# Patient Record
Sex: FEMALE | Race: WHITE | NOT HISPANIC OR LATINO | Employment: FULL TIME | URBAN - METROPOLITAN AREA
[De-identification: names, ages, dates, MRNs, and addresses within clinical notes are randomized per-mention and may not be internally consistent; named-entity substitution may affect disease eponyms.]

---

## 2019-10-15 ENCOUNTER — RECORDS - HEALTHEAST (OUTPATIENT)
Dept: LAB | Facility: CLINIC | Age: 24
End: 2019-10-15

## 2019-10-15 LAB — HIV 1+2 AB+HIV1 P24 AG SERPL QL IA: NEGATIVE

## 2019-10-16 LAB
C TRACH DNA SPEC QL PROBE+SIG AMP: NEGATIVE
T PALLIDUM AB SER QL: NEGATIVE

## 2021-10-14 ENCOUNTER — TRANSFERRED RECORDS (OUTPATIENT)
Dept: HEALTH INFORMATION MANAGEMENT | Facility: CLINIC | Age: 26
End: 2021-10-14
Payer: COMMERCIAL

## 2021-10-26 ENCOUNTER — LAB REQUISITION (OUTPATIENT)
Dept: LAB | Facility: CLINIC | Age: 26
End: 2021-10-26
Payer: COMMERCIAL

## 2021-10-26 DIAGNOSIS — Z11.8 ENCOUNTER FOR SCREENING FOR OTHER INFECTIOUS AND PARASITIC DISEASES: ICD-10-CM

## 2021-10-26 PROCEDURE — 87491 CHLMYD TRACH DNA AMP PROBE: CPT | Mod: ORL | Performed by: FAMILY MEDICINE

## 2021-10-28 LAB — C TRACH DNA SPEC QL NAA+PROBE: NEGATIVE

## 2022-02-06 ENCOUNTER — HEALTH MAINTENANCE LETTER (OUTPATIENT)
Age: 27
End: 2022-02-06

## 2022-05-11 ENCOUNTER — TRANSCRIBE ORDERS (OUTPATIENT)
Dept: MATERNAL FETAL MEDICINE | Facility: CLINIC | Age: 27
End: 2022-05-11
Payer: COMMERCIAL

## 2022-05-11 ENCOUNTER — TRANSCRIBE ORDERS (OUTPATIENT)
Dept: MATERNAL FETAL MEDICINE | Facility: HOSPITAL | Age: 27
End: 2022-05-11
Payer: COMMERCIAL

## 2022-05-11 DIAGNOSIS — Z31.69 ENCOUNTER FOR PRECONCEPTION CONSULTATION: Primary | ICD-10-CM

## 2022-05-25 ENCOUNTER — PRE VISIT (OUTPATIENT)
Dept: MATERNAL FETAL MEDICINE | Facility: HOSPITAL | Age: 27
End: 2022-05-25
Payer: COMMERCIAL

## 2022-05-26 ENCOUNTER — OFFICE VISIT (OUTPATIENT)
Dept: MATERNAL FETAL MEDICINE | Facility: HOSPITAL | Age: 27
End: 2022-05-26
Attending: NURSE PRACTITIONER
Payer: COMMERCIAL

## 2022-05-26 ENCOUNTER — LAB (OUTPATIENT)
Dept: LAB | Facility: HOSPITAL | Age: 27
End: 2022-05-26
Attending: NURSE PRACTITIONER
Payer: COMMERCIAL

## 2022-05-26 DIAGNOSIS — Z31.69 ENCOUNTER FOR PRECONCEPTION CONSULTATION: ICD-10-CM

## 2022-05-26 DIAGNOSIS — Z31.430 ENCOUNTER OF FEMALE FOR TESTING FOR GENETIC DISEASE CARRIER STATUS FOR PROCREATIVE MANAGEMENT: ICD-10-CM

## 2022-05-26 DIAGNOSIS — Z31.430 ENCOUNTER OF FEMALE FOR TESTING FOR GENETIC DISEASE CARRIER STATUS FOR PROCREATIVE MANAGEMENT: Primary | ICD-10-CM

## 2022-05-26 PROCEDURE — 36415 COLL VENOUS BLD VENIPUNCTURE: CPT

## 2022-05-26 PROCEDURE — 96040 HC GENETIC COUNSELING, EACH 30 MINUTES: CPT | Performed by: GENETIC COUNSELOR, MS

## 2022-05-26 NOTE — PROGRESS NOTES
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       Impression/Plan:   Concepción had a genetic consultation today. We reviewed the sperm donors carrier screening information. I offered to send carrier testing for a larger expanded carrier screening to [facility] laboratory (as indicated on the donor's profile) where the donor had testing for the most comprehensive carrier screening. 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     The sperm donor is also a carrier for spinal muscular atrophy. Concepción had the [employer] 14 gene  Carrier screening panel through her primary OB/Gyn clinic. 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 We discussed that this result reduced her risk to be a carrier but does not eliminate the risk. Concepción verbalized an understanding of the limitations of negative carrier screening results.    MCCC2 and GLDC carrier testing was sent to [facility] today. Results are expected in 14-21 days.    Pregnancy History:   /Parity: G0   Age: 27 year old       Family History:   A three-generation pedigree was obtained, and is scanned under the  Media  tab.   The following significant findings were reported by Concepción:  Concepción's mom was diagnosed with breast cancer at the age of 55. Three of Concepción's maternal great aunts were also diagnosed with breast cancer. Concepción is not aware of any genetic testing that was performed for family members.  Most cancers result from a combination of genetic factors and environment. A smaller proportion of cancers are caused  by single gene changes that can be inherited and increase one s risk of developing cancer. We discussed that reviewing her family history with a primary care physician is important so that she can have appropriate surveillance.    Otherwise, the reported family history is negative for multiple miscarriages, stillbirths, birth defects, mental retardation, known genetic conditions, and consanguinity.       Carrier Screening:   Concepción is pursuing intrauterine insemination with a sperm donor. 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 Records for the sperm donor were not available prior today's appointment.    Spinal muscular atrophy (SMA) results in the progressive degeneration of motor neurons. The loss of motor neurons leads to muscle atrophy and impairs an individuals ability to move. There are five main subtypes of SMA. In severe cases, infants with SMA are unable to sit-up independently, have difficulty with feeding and may have respiratory compromise. Infants with the severe form of SMA have a shortened lifespan. Individuals with milder forms of SMA may not have symptoms until later in childhood or adulthood and may not be ambulatory.SMA is an autosomal recessive genetic condition primarily caused by deletions or point mutations in the SMN1 gene. Individuals with SMA most commonly have a deletion in both copies of their SMN1 genes .Individuals that are carriers have one functional copy of the SMN1 gene and one non-functional/deleted copy of the SMN1 gene. Carriers do not develop symptoms of SMA.     3-methylcrotonyl carboxylase deficiency is a metabolic disorder that occurs when the body cannot break-down leucine. It is caused by pathogenic variants in either the MCCC1 or MCCC2 gene. Although clinical presentation may vary, at the more severe end of the spectrum  individuals may have vomiting, diarrhea, lethargy, developmental delay, intellectual disability, seizures, hypotonia,  Hypoglycemia, hyperammonemia,coma and death.  3-methylcrotonyl carboxylase deficiency is an autosomal recessive condition.  Individuals with 3-detention have a pathogenic variant in both copies of their MCCC1 or their MCCC2 genes. .Individuals that are carriers have one functional copy of the either MCCC1 or MCCC2 and one non-functional copy. Carriers do not develop symptoms.    Glycine encephalopathy is also a metabolic disorder that result when the body cannot break-down glycine. It is caused by pathogenic variants in either the AMT or GLDC gene. Clinical presentation can include lethargy, seizures, hypotonia, breathing difficulties, and developmental delays. Glycine encephalopathy is an autosomal recessive condition. Individuals with glycine encephalopathy have a pathogenic variant in both copies of their AMT or GLDC genes. .Individuals that are carriers have one functional copy of the either AMT or GLDC gene and one non-functional copy. Carriers do not develop symptoms.       Testing Options & Risk assessment:   Concepción had a 14 gene carrier screening panel through her primary OB clinic (the [employer] 14 gene panel). This test does include SMN1 deletion analysis. Concepción's [employer] carrier screening result was negative for all 14 genes. I discussed that a negative SMA carrier screening result reduces (but does not eliminate) the risk that she is a carrier for SMA.     Concepción was able to provide some additional information regarding the sperm donor's carrier screening results. 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  His testing was performed by the [facility] laboratory and reportedly included 300 genetic conditions. For the most comprehensive testing option, I offered Concepción the  option of testing through [facility] to be sure we are capturing testing for all the same conditions that the sperm donor was tested for as part of his carrier testing. Concepción declined larger, expanded carrier screening panel.    Concepción preferred to only have testing for MCCC2 (3-methylcrotonyl carboxylase deficiency) and GLDC (glycine encephalopathy). Concepción understood that by testing for these two conditions, she would not have a more comprehensive carrier screening panel. The following was discussed as part of informed consent in addition to the above information:      We discussed [facility] carrier screening which is offered with several different panels. Concepción choose to only test for MCCC2 and GLDC.        Results are typically available in 10-21 days. We will call Concepción with the results and the report will eventually be available via [facility]'s patient portal.       There are implications for family members. If an individual is a carrier of a condition there is a chance for relatives to also be a carrier. This may be helpful information to disclose to family (siblings, cousins) so they may choose if they want to pursue carriers screening. In addition, if only one parent is found to be a carrier, there is a 50% chance for each child to be a carrier. This may be helpful information for the patient's children when they start a family.       [facility] will contact the patient via text, e-mail, or both with cost estimate information. The communication will list the cost billed through insurance and provide an option for self-pay. The default is insurance billing so patients must choose the self pay option and follow through with credit card information if desired. The patient has the responsibility to determine if insurance or self pay is a better financial decision.     Concepción indicated that if she is a carrier for 3-methylcrotonyl carboxylase deficiency or glycine encephalopathy she is planning to choose a new sperm donor.        It was a pleasure to be involved with Concepción s care. Face-to-face time of the meeting was 30 minutes.    Maisha Gould MS, [facility]  Maternal Fetal Medicine  [facility]  Phone:[phone]

## 2022-10-02 ENCOUNTER — HEALTH MAINTENANCE LETTER (OUTPATIENT)
Age: 27
End: 2022-10-02

## 2023-02-11 ENCOUNTER — HEALTH MAINTENANCE LETTER (OUTPATIENT)
Age: 28
End: 2023-02-11

## 2023-09-12 ENCOUNTER — LAB REQUISITION (OUTPATIENT)
Dept: LAB | Facility: CLINIC | Age: 28
End: 2023-09-12
Payer: COMMERCIAL

## 2023-09-12 DIAGNOSIS — K52.9 NONINFECTIVE GASTROENTERITIS AND COLITIS, UNSPECIFIED: ICD-10-CM

## 2023-09-12 PROCEDURE — 80053 COMPREHEN METABOLIC PANEL: CPT | Mod: ORL | Performed by: FAMILY MEDICINE

## 2023-09-13 ENCOUNTER — LAB REQUISITION (OUTPATIENT)
Dept: LAB | Facility: CLINIC | Age: 28
End: 2023-09-13
Payer: COMMERCIAL

## 2023-09-13 DIAGNOSIS — K52.9 NONINFECTIVE GASTROENTERITIS AND COLITIS, UNSPECIFIED: ICD-10-CM

## 2023-09-13 LAB
ALBUMIN SERPL BCG-MCNC: 4.5 G/DL (ref 3.5–5.2)
ALP SERPL-CCNC: 66 U/L (ref 35–104)
ALT SERPL W P-5'-P-CCNC: 18 U/L (ref 0–50)
ANION GAP SERPL CALCULATED.3IONS-SCNC: 12 MMOL/L (ref 7–15)
AST SERPL W P-5'-P-CCNC: 19 U/L (ref 0–45)
BILIRUB SERPL-MCNC: 0.2 MG/DL
BUN SERPL-MCNC: 8.5 MG/DL (ref 6–20)
CALCIUM SERPL-MCNC: 9.2 MG/DL (ref 8.6–10)
CHLORIDE SERPL-SCNC: 104 MMOL/L (ref 98–107)
CREAT SERPL-MCNC: 0.9 MG/DL (ref 0.51–0.95)
DEPRECATED HCO3 PLAS-SCNC: 24 MMOL/L (ref 22–29)
EGFRCR SERPLBLD CKD-EPI 2021: 89 ML/MIN/1.73M2
GLUCOSE SERPL-MCNC: 92 MG/DL (ref 70–99)
POTASSIUM SERPL-SCNC: 4 MMOL/L (ref 3.4–5.3)
PROT SERPL-MCNC: 7.1 G/DL (ref 6.4–8.3)
SODIUM SERPL-SCNC: 140 MMOL/L (ref 136–145)

## 2023-09-13 PROCEDURE — 87493 C DIFF AMPLIFIED PROBE: CPT | Mod: ORL | Performed by: FAMILY MEDICINE

## 2023-09-13 PROCEDURE — 87507 IADNA-DNA/RNA PROBE TQ 12-25: CPT | Mod: ORL | Performed by: FAMILY MEDICINE

## 2023-09-14 LAB — C DIFF TOX B STL QL: NEGATIVE

## 2023-09-15 LAB

## 2023-11-03 ENCOUNTER — LAB REQUISITION (OUTPATIENT)
Dept: LAB | Facility: CLINIC | Age: 28
End: 2023-11-03
Payer: COMMERCIAL

## 2023-11-03 DIAGNOSIS — E03.9 HYPOTHYROIDISM, UNSPECIFIED: ICD-10-CM

## 2023-11-03 DIAGNOSIS — Z11.3 ENCOUNTER FOR SCREENING FOR INFECTIONS WITH A PREDOMINANTLY SEXUAL MODE OF TRANSMISSION: ICD-10-CM

## 2023-11-03 PROCEDURE — 87491 CHLMYD TRACH DNA AMP PROBE: CPT | Mod: ORL | Performed by: FAMILY MEDICINE

## 2023-11-03 PROCEDURE — 86780 TREPONEMA PALLIDUM: CPT | Mod: ORL | Performed by: FAMILY MEDICINE

## 2023-11-03 PROCEDURE — 87591 N.GONORRHOEAE DNA AMP PROB: CPT | Mod: ORL | Performed by: FAMILY MEDICINE

## 2023-11-03 PROCEDURE — 84443 ASSAY THYROID STIM HORMONE: CPT | Mod: ORL | Performed by: FAMILY MEDICINE

## 2023-11-04 LAB
C TRACH DNA SPEC QL PROBE+SIG AMP: NEGATIVE
N GONORRHOEA DNA SPEC QL NAA+PROBE: NEGATIVE
T PALLIDUM AB SER QL: NONREACTIVE
TSH SERPL DL<=0.005 MIU/L-ACNC: 2.85 UIU/ML (ref 0.3–4.2)

## 2023-11-07 ENCOUNTER — LAB REQUISITION (OUTPATIENT)
Dept: LAB | Facility: CLINIC | Age: 28
End: 2023-11-07
Payer: COMMERCIAL

## 2023-11-07 DIAGNOSIS — Z11.3 ENCOUNTER FOR SCREENING FOR INFECTIONS WITH A PREDOMINANTLY SEXUAL MODE OF TRANSMISSION: ICD-10-CM

## 2023-11-07 PROCEDURE — 87389 HIV-1 AG W/HIV-1&-2 AB AG IA: CPT | Mod: ORL | Performed by: FAMILY MEDICINE

## 2023-11-08 LAB — HIV 1+2 AB+HIV1 P24 AG SERPL QL IA: NONREACTIVE

## 2024-02-20 ENCOUNTER — LAB REQUISITION (OUTPATIENT)
Dept: LAB | Facility: CLINIC | Age: 29
End: 2024-02-20

## 2024-02-20 DIAGNOSIS — M62.838 OTHER MUSCLE SPASM: ICD-10-CM

## 2024-02-20 PROCEDURE — 83735 ASSAY OF MAGNESIUM: CPT | Performed by: FAMILY MEDICINE

## 2024-02-21 LAB — MAGNESIUM SERPL-MCNC: 2.2 MG/DL (ref 1.7–2.3)

## 2024-03-09 ENCOUNTER — HEALTH MAINTENANCE LETTER (OUTPATIENT)
Age: 29
End: 2024-03-09

## 2024-07-09 ENCOUNTER — APPOINTMENT (OUTPATIENT)
Dept: ULTRASOUND IMAGING | Facility: CLINIC | Age: 29
End: 2024-07-09
Attending: EMERGENCY MEDICINE
Payer: COMMERCIAL

## 2024-07-09 ENCOUNTER — HOSPITAL ENCOUNTER (EMERGENCY)
Facility: CLINIC | Age: 29
Discharge: HOME | End: 2024-07-09
Attending: EMERGENCY MEDICINE | Admitting: EMERGENCY MEDICINE
Payer: COMMERCIAL

## 2024-07-09 VITALS
TEMPERATURE: 98 F | BODY MASS INDEX: 33.75 KG/M2 | SYSTOLIC BLOOD PRESSURE: 135 MMHG | HEIGHT: 66 IN | DIASTOLIC BLOOD PRESSURE: 78 MMHG | RESPIRATION RATE: 18 BRPM | OXYGEN SATURATION: 96 % | WEIGHT: 210 LBS | HEART RATE: 76 BPM

## 2024-07-09 DIAGNOSIS — N93.9 VAGINAL BLEEDING: ICD-10-CM

## 2024-07-09 DIAGNOSIS — R89.9 ABNORMAL LABORATORY TEST: ICD-10-CM

## 2024-07-09 PROBLEM — F42.2 MIXED OBSESSIONAL THOUGHTS AND ACTS: Status: ACTIVE | Noted: 2019-02-14

## 2024-07-09 PROBLEM — S52.502A CLOSED FRACTURE OF LEFT DISTAL RADIUS: Status: ACTIVE | Noted: 2022-10-27

## 2024-07-09 LAB
ABO/RH(D): NORMAL
ANTIBODY SCREEN: NEGATIVE
ERYTHROCYTE [DISTWIDTH] IN BLOOD BY AUTOMATED COUNT: 12.9 % (ref 10–15)
HCG INTACT+B SERPL-ACNC: 1672 MIU/ML
HCT VFR BLD AUTO: 37.2 % (ref 35–47)
HGB BLD-MCNC: 12.2 G/DL (ref 11.7–15.7)
MCH RBC QN AUTO: 28.4 PG (ref 26.5–33)
MCHC RBC AUTO-ENTMCNC: 32.8 G/DL (ref 31.5–36.5)
MCV RBC AUTO: 87 FL (ref 78–100)
PLATELET # BLD AUTO: 431 10E3/UL (ref 150–450)
RBC # BLD AUTO: 4.3 10E6/UL (ref 3.8–5.2)
SPECIMEN EXPIRATION DATE: NORMAL
WBC # BLD AUTO: 11.3 10E3/UL (ref 4–11)

## 2024-07-09 PROCEDURE — 76801 OB US < 14 WKS SINGLE FETUS: CPT

## 2024-07-09 PROCEDURE — 99285 EMERGENCY DEPT VISIT HI MDM: CPT | Mod: 25

## 2024-07-09 PROCEDURE — 36415 COLL VENOUS BLD VENIPUNCTURE: CPT | Performed by: EMERGENCY MEDICINE

## 2024-07-09 PROCEDURE — 86900 BLOOD TYPING SEROLOGIC ABO: CPT | Performed by: EMERGENCY MEDICINE

## 2024-07-09 PROCEDURE — 85027 COMPLETE CBC AUTOMATED: CPT | Performed by: EMERGENCY MEDICINE

## 2024-07-09 PROCEDURE — 84702 CHORIONIC GONADOTROPIN TEST: CPT | Performed by: EMERGENCY MEDICINE

## 2024-07-09 ASSESSMENT — COLUMBIA-SUICIDE SEVERITY RATING SCALE - C-SSRS
1. IN THE PAST MONTH, HAVE YOU WISHED YOU WERE DEAD OR WISHED YOU COULD GO TO SLEEP AND NOT WAKE UP?: NO
2. HAVE YOU ACTUALLY HAD ANY THOUGHTS OF KILLING YOURSELF IN THE PAST MONTH?: NO
6. HAVE YOU EVER DONE ANYTHING, STARTED TO DO ANYTHING, OR PREPARED TO DO ANYTHING TO END YOUR LIFE?: NO

## 2024-07-09 ASSESSMENT — ACTIVITIES OF DAILY LIVING (ADL): ADLS_ACUITY_SCORE: 35

## 2024-07-09 NOTE — ED PROVIDER NOTES
EMERGENCY DEPARTMENT ENCOUNTER      NAME: Concepción Kapadia  AGE: 29 year old female  YOB: 1995  MRN: [id number]  EVALUATION DATE & TIME: No admission date for patient encounter.    ED PROVIDER: Kathleen Zavala MD    Chief Complaint   Patient presents with    Vaginal Bleeding - Pregnant       FINAL IMPRESSION  1. Abnormal laboratory test    2. Vaginal bleeding        MEDICAL DECISION MAKING   Concepción Kapadia is a 29 year old female who presents for evaluation of vaginal bleeding and rising hCG levels.  Patient was seen at the women's clinic on 7/3 (7 days ago)  patient had and underwent medical .  She reports that  prior to this, she had been experiencing some sharp right lower quadrant pain but since taking the medication, this has completely resolved.  She has had some vaginal bleeding which she reports increased yesterday but has since been decreasing.  Yesterday, she was seen in clinic for repeat labs and had an hCG level drawn.  Today, she received a phone call notifying her that the level was increasing  and she was advised to come to the emergency department to rule out ectopic pregnancy.  Patient has no associated fever, dysuria, hematuria, lightheadedness, dyspnea, or abdominal pain.  No other new complaints.    I considered a broad differential including but not limited to incomplete/missed , ectopic pregnancy, molar pregnancy, spurious lab. Discussed options for workup and management with patient and agreed on plan for repeat labs and US.       ED Course as of 07/10/24 0030    OB  US 1st trimester w transvag  Ultrasound without clear evidence of ectopic, incomplete , retained products, or other abnormality to explain symptoms.    CBC (+ platelets, no diff)(!)  CBC reassuring. No evidence of leukocytosis to suggest systemic infectious/inflammatory process. No acute anemia. PLTs wnl.     hCG Quantitative(!): 1,672  hCG level elevated but without  recent for comparison in our system.      Patient was Rh+ so will not require RhoGAM.  Labs and ultrasound as she and her mother.  I also discussed the case with Dr. Correa with [facility] who agreed that overall, workup today is reassuring.  Unfortunately, given the previous hCG was obtained at outside labs, we will but unable to compare today.  We have agreed that would expect this to be higher if related to ectopic or molar pregnancy.  Patient is hemodynamically stable here and tolerating p.o. without any difficulty.  OB team recommended discharge and follow-up for repeat labs/ultrasound in 48 hours.  Someone from their team will reach out to the patient to arrange this appointment.  I rechecked the patient and updated her with these recommendations and she and her mother were comfortable with plan.    At the end of the encounter, we reviewed the results, potential diagnoses, as well as return precautions and recommendations for follow up. I instructed Ms. Kapadia to return to the emergency department immediately if she develops any new or worsening symptoms and provided additional verbal discharge instructions. Ms. Kapadia expressed understanding and agreement with this plan of care, her questions were answered, and she was discharged in stable condition.      Considerations in Medical Decision Making  History:  Supplemental history from: Family Member/Significant Other  External Record(s) reviewed: Documented in chart    Work Up:  Chart documentation includes differential considered and any EKGs or imaging independently interpreted by provider, where specified.  In additional to work up documented, I considered the following work up: Documented in chart, if applicable.    External consultation:  Discussion of management with another provider: OB-Gyn    Complicating factors:  Care impacted by chronic illness: N/A  Care affected by social determinants of health: Access to Medical Care    Disposition  considerations: Discharge. No recommendations on prescription strength medication(s). I considered admission, but ultimately discharged patient after speaking with OB.      ED COURSE  4:23 PM I met with the patient for initial interview and encounter. We discussed a plan for treatment and diagnostic interventions.   7:55 PM I spoke with Dr. Correa, OB GYN from [facility]  8:01 PM I rechecked and updated the patient on results. We discussed the plan for discharge and the patient is agreeable. Reviewed supportive cares, symptomatic treatment, outpatient follow up, and reasons to return to the Emergency Department. All questions and concerns were addressed. Patient to be discharged by ED RN.       MEDICATIONS GIVEN IN THE ED  Medications - No data to display    NEW PRESCRIPTIONS STARTED AT TODAY'S VISIT  There are no discharge medications for this patient.         =================================================================    HPI:    Patient information was obtained from: the patient    Use of : N/A      Concepción Kapadia is a 29 year old female who presents to the ED by private car to rule out ectopic pregnancy.    The patient reports receiving a phone call from [facility] today about her HGC levels rising, so they advised her to present to the ED to rule out an ectopic pregnancy.    She reports going to [facility] on 7/3 (7 days ago) for an . She states taking the  medication, resolving the sharp RLQ abdominal pain. She endorses vaginal bleeding but it has been decreasing in amount.     She states she was lightheaded when she received the phone call from her clinic but thinks it is stress-related. She took Advil prior to arrival. No history of abdominal surgery or ovarian cyst.     The patient denies back pain, dysuria, hematuria, and any other complaints at this time.       RELEVANT HISTORY, MEDICATIONS, & ALLERGIES   Past medical history, surgical  "history, family history, medications, and allergies reviewed and pertinent noted in HPI.    REVIEW OF SYSTEMS:  A complete review of systems was performed with pertinent positives and negatives noted in the HPI. All other systems negative.     PHYSICAL EXAM:    Vitals: /78   Pulse 76   Temp 98  F (36.7  C) (Temporal)   Resp 18   Ht 1.676 m (5' 6\")   Wt 95.3 kg (210 lb)   LMP 07/04/2024 (Exact Date)   SpO2 96%   BMI 33.89 kg/m     General: Alert and interactive, comfortable appearing.  HENT: Atraumatic. Full AROM of neck. MMM.  Cardiovascular: Regular rate and rhythm.   Chest/Pulmonary: Normal work of breathing. Speaking in complete sentences. Lungs CTAB. No chest wall tenderness or deformities.  Abdomen: Soft, nondistended. Nontender without guarding or rebound.  Extremities: Normal AROM of all major joints.  Skin: Warm and dry. Normal skin color.   Neuro: Speech clear. CNs grossly intact. Moves all extremities spontaneously.   Psych: Normal affect/mood, cooperative, memory appropriate.      LAB  Labs Ordered and Resulted from Time of ED Arrival to Time of ED Departure   HCG QUANTITATIVE PREGNANCY - Abnormal       Result Value    hCG Quantitative 1,672 (*)    CBC WITH PLATELETS - Abnormal    WBC Count 11.3 (*)     RBC Count 4.30      Hemoglobin 12.2      Hematocrit 37.2      MCV 87      MCH 28.4      MCHC 32.8      RDW 12.9      Platelet Count 431     TYPE AND SCREEN, ADULT    ABO/RH(D) A POS      Antibody Screen Negative      SPECIMEN EXPIRATION DATE [id number]     ABO/RH TYPE AND SCREEN       RADIOLOGY  OB  US 1st trimester w transvag   Final Result   IMPRESSION:       1.  Pregnancy of unknown location. Possibilities include failed pregnancy (favored in this clinical context), an unidentified ectopic pregnancy, or an early intrauterine pregnancy (too early to detect). Correlate with beta hCG values and consider    follow-up ultrasound in 7-14 days.       2.  If this is a failed pregnancy, there " is no sonographic evidence of retained products of conception.            I, Murali Erazo, am serving as a scribe to document services personally performed by Dr. Kathleen Zavala based on my observation and the provider's statements to me. I, Kathleen Zavala MD attest that Murali Erazo is acting in a scribe capacity, has observed my performance of the services and has documented them in accordance with my direction.    Kathleen Zavala M.D.  Emergency Medicine  [facility] EMERGENCY ROOM  [address] [address]  [phone]  Dept: [phone]     Kathleen Zavala MD  07/10/24 0030

## 2024-07-10 NOTE — DISCHARGE INSTRUCTIONS
You were seen in the Emergency Department today for a recheck of labs.    Like we talked about, your hCG level here was elevated but without having the previous done in the same lab, is difficult to compare.  I think that it will continue to come down but may be just taking longer than expected.  Your ultrasound today did not show any abnormalities but I am concerned about or that make me worried about an ectopic or other complication from the .  Your labs also are very stable.      Please return to the ER if you experience increasing bleeding, pain, fevers, and/or for any other new or concerning symptoms, otherwise please follow up with the OB clinic in 48 hours days for recheck.  Someone from the clinic should be calling in the morning to set up an appointment.    Thank you for choosing [facility]. It was a pleasure taking care of you today!  - Dr. Kathleen Zavala

## 2024-07-11 ENCOUNTER — LAB (OUTPATIENT)
Dept: LAB | Facility: CLINIC | Age: 29
End: 2024-07-11
Payer: COMMERCIAL

## 2024-07-11 DIAGNOSIS — R89.9 ABNORMAL LABORATORY TEST RESULT: Primary | ICD-10-CM

## 2024-07-11 LAB — HCG INTACT+B SERPL-ACNC: 1729 MIU/ML

## 2024-07-11 PROCEDURE — 36415 COLL VENOUS BLD VENIPUNCTURE: CPT

## 2024-07-11 PROCEDURE — 84702 CHORIONIC GONADOTROPIN TEST: CPT

## 2024-07-12 ENCOUNTER — HOSPITAL ENCOUNTER (EMERGENCY)
Facility: CLINIC | Age: 29
End: 2024-07-12
Payer: COMMERCIAL

## 2024-07-12 NOTE — ED NOTES
Expected Patient Referral to ED  4:10 PM    Referring Clinic/Provider:  [facility]    Reason for referral/Clinical facts:  [facility] per [facility]) - didn't have confirmed IUP prior to this termination, then seen here in the ED on  and had US that showed no clear evidence of ectopic, incomplete , retained products or other abnormality to explain symptoms and discharged with plan for repeat beta, beta HCG yesterday has now gone up so OB sending in for repeat Beta HCG and if still going up concerning for ectopic and will need methotrexate (call them after repeat Beta HCG)    Recommendations provided:  Send to ED for further evaluation    Caller was informed that this institution does possess the capabilities and/or resources to provide for patient and should be transferred to our facility.    Discussed that if direct admit is sought and any hurdles are encountered, this ED would be happy to see the patient and evaluate.    Informed caller that recommendations provided are recommendations based only on the facts provided and that they responsible to accept or reject the advice, or to seek a formal in person consultation as needed and that this ED will see/treat patient should they arrive.      Ciarra Cam MD  [facility] EMERGENCY ROOM  [facility] [facility] [address] [phone]       Ciarra Cam MD  [phone]

## 2024-07-13 ENCOUNTER — HOSPITAL ENCOUNTER (EMERGENCY)
Facility: CLINIC | Age: 29
Discharge: HOME | End: 2024-07-13
Attending: STUDENT IN AN ORGANIZED HEALTH CARE EDUCATION/TRAINING PROGRAM | Admitting: STUDENT IN AN ORGANIZED HEALTH CARE EDUCATION/TRAINING PROGRAM
Payer: COMMERCIAL

## 2024-07-13 ENCOUNTER — APPOINTMENT (OUTPATIENT)
Dept: ULTRASOUND IMAGING | Facility: CLINIC | Age: 29
End: 2024-07-13
Payer: COMMERCIAL

## 2024-07-13 VITALS
WEIGHT: 216 LBS | OXYGEN SATURATION: 99 % | DIASTOLIC BLOOD PRESSURE: 57 MMHG | BODY MASS INDEX: 34.72 KG/M2 | HEIGHT: 66 IN | TEMPERATURE: 98.6 F | RESPIRATION RATE: 20 BRPM | HEART RATE: 80 BPM | SYSTOLIC BLOOD PRESSURE: 110 MMHG

## 2024-07-13 DIAGNOSIS — O07.4: ICD-10-CM

## 2024-07-13 DIAGNOSIS — O02.81 INAPPROPRIATE CHANGE IN QUANTITATIVE HUMAN CHORIONIC GONADOTROPIN (HCG) IN EARLY PREGNANCY: ICD-10-CM

## 2024-07-13 PROBLEM — E03.8 SUBCLINICAL HYPOTHYROIDISM: Status: ACTIVE | Noted: 2024-07-13

## 2024-07-13 PROBLEM — N97.9 FEMALE INFERTILITY: Status: ACTIVE | Noted: 2024-07-13

## 2024-07-13 PROBLEM — N91.1 SECONDARY AMENORRHEA: Status: ACTIVE | Noted: 2024-07-13

## 2024-07-13 PROBLEM — N88.8 CERVICAL DISCHARGE: Status: ACTIVE | Noted: 2024-07-13

## 2024-07-13 PROBLEM — A64 SEXUALLY TRANSMITTED INFECTION: Status: ACTIVE | Noted: 2024-07-13

## 2024-07-13 LAB
ALBUMIN SERPL BCG-MCNC: 4.2 G/DL (ref 3.5–5.2)
ALP SERPL-CCNC: 72 U/L (ref 40–150)
ALT SERPL W P-5'-P-CCNC: 35 U/L (ref 0–50)
ANION GAP SERPL CALCULATED.3IONS-SCNC: 12 MMOL/L (ref 7–15)
AST SERPL W P-5'-P-CCNC: 33 U/L (ref 0–45)
BASOPHILS # BLD AUTO: 0 10E3/UL (ref 0–0.2)
BASOPHILS NFR BLD AUTO: 1 %
BILIRUB DIRECT SERPL-MCNC: <0.2 MG/DL (ref 0–0.3)
BILIRUB SERPL-MCNC: 0.4 MG/DL
BUN SERPL-MCNC: 10.3 MG/DL (ref 6–20)
CALCIUM SERPL-MCNC: 9.1 MG/DL (ref 8.6–10)
CHLORIDE SERPL-SCNC: 102 MMOL/L (ref 98–107)
CREAT SERPL-MCNC: 0.78 MG/DL (ref 0.51–0.95)
DEPRECATED HCO3 PLAS-SCNC: 23 MMOL/L (ref 22–29)
EGFRCR SERPLBLD CKD-EPI 2021: >90 ML/MIN/1.73M2
EOSINOPHIL # BLD AUTO: 0.3 10E3/UL (ref 0–0.7)
EOSINOPHIL NFR BLD AUTO: 3 %
ERYTHROCYTE [DISTWIDTH] IN BLOOD BY AUTOMATED COUNT: 12.8 % (ref 10–15)
GLUCOSE SERPL-MCNC: 90 MG/DL (ref 70–99)
HCG INTACT+B SERPL-ACNC: 2092 MIU/ML
HCT VFR BLD AUTO: 36 % (ref 35–47)
HGB BLD-MCNC: 12 G/DL (ref 11.7–15.7)
HOLD SPECIMEN: NORMAL
HOLD SPECIMEN: NORMAL
IMM GRANULOCYTES # BLD: 0 10E3/UL
IMM GRANULOCYTES NFR BLD: 0 %
LYMPHOCYTES # BLD AUTO: 2.5 10E3/UL (ref 0.8–5.3)
LYMPHOCYTES NFR BLD AUTO: 31 %
MCH RBC QN AUTO: 28.6 PG (ref 26.5–33)
MCHC RBC AUTO-ENTMCNC: 33.3 G/DL (ref 31.5–36.5)
MCV RBC AUTO: 86 FL (ref 78–100)
MONOCYTES # BLD AUTO: 0.6 10E3/UL (ref 0–1.3)
MONOCYTES NFR BLD AUTO: 7 %
NEUTROPHILS # BLD AUTO: 4.7 10E3/UL (ref 1.6–8.3)
NEUTROPHILS NFR BLD AUTO: 58 %
NRBC # BLD AUTO: 0 10E3/UL
NRBC BLD AUTO-RTO: 0 /100
PLATELET # BLD AUTO: 392 10E3/UL (ref 150–450)
POTASSIUM SERPL-SCNC: 4.3 MMOL/L (ref 3.4–5.3)
PROT SERPL-MCNC: 7 G/DL (ref 6.4–8.3)
RBC # BLD AUTO: 4.19 10E6/UL (ref 3.8–5.2)
SODIUM SERPL-SCNC: 137 MMOL/L (ref 135–145)
WBC # BLD AUTO: 8.1 10E3/UL (ref 4–11)

## 2024-07-13 PROCEDURE — 82040 ASSAY OF SERUM ALBUMIN: CPT

## 2024-07-13 PROCEDURE — 250N000011 HC RX IP 250 OP 636: Performed by: OBSTETRICS & GYNECOLOGY

## 2024-07-13 PROCEDURE — 99285 EMERGENCY DEPT VISIT HI MDM: CPT | Mod: 25

## 2024-07-13 PROCEDURE — 36415 COLL VENOUS BLD VENIPUNCTURE: CPT

## 2024-07-13 PROCEDURE — 84702 CHORIONIC GONADOTROPIN TEST: CPT

## 2024-07-13 PROCEDURE — 85004 AUTOMATED DIFF WBC COUNT: CPT

## 2024-07-13 PROCEDURE — 96401 CHEMO ANTI-NEOPL SQ/IM: CPT

## 2024-07-13 PROCEDURE — 76801 OB US < 14 WKS SINGLE FETUS: CPT

## 2024-07-13 RX ORDER — METHOTREXATE 25 MG/ML
50 INJECTION INTRA-ARTERIAL; INTRAMUSCULAR; INTRATHECAL; INTRAVENOUS ONCE
Status: COMPLETED | OUTPATIENT
Start: 2024-07-13 | End: 2024-07-13

## 2024-07-13 RX ADMIN — METHOTREXATE 107 MG: 25 SOLUTION INTRA-ARTERIAL; INTRAMUSCULAR; INTRATHECAL; INTRAVENOUS at 15:44

## 2024-07-13 ASSESSMENT — COLUMBIA-SUICIDE SEVERITY RATING SCALE - C-SSRS
2. HAVE YOU ACTUALLY HAD ANY THOUGHTS OF KILLING YOURSELF IN THE PAST MONTH?: NO
6. HAVE YOU EVER DONE ANYTHING, STARTED TO DO ANYTHING, OR PREPARED TO DO ANYTHING TO END YOUR LIFE?: NO
1. IN THE PAST MONTH, HAVE YOU WISHED YOU WERE DEAD OR WISHED YOU COULD GO TO SLEEP AND NOT WAKE UP?: NO

## 2024-07-13 ASSESSMENT — ACTIVITIES OF DAILY LIVING (ADL)
ADLS_ACUITY_SCORE: 35

## 2024-07-13 NOTE — Clinical Note
Concepción Kapadia was seen and treated in our emergency department on 7/13/2024.         Sincerely,     [facility] Emergency Room

## 2024-07-13 NOTE — DISCHARGE INSTRUCTIONS
Your emergency department evaluation was reassuring today.  Your hCG levels boris again today (2,092) from your level on 07/11 (1,729).  You received your one time dose of methotrexate in the ER today.    Follow up with [facility] for follow up within 4 days.  Discontinue any vitamins and non-steroidal medications, like ibuprofen and naproxen (check with your provider at the next clinic visit when to resume)   Do not consume alcohol (check with your provider at the next clinic visit when to resume)  Avoid intercourse (check with your provider at the next clinic visit when to resume)  Avoid vigorous physical activity (check with your provider at the next clinic visit when to resume)  Contact doctor's clinic if experiencing severe abdominal pain, chest pain, weakness, dizziness or fainting after Methotrexate is given.       Call 911 anytime you think you may need emergency care. For example, call if:    You passed out (lost consciousness).     You have severe vaginal bleeding. This means that you are soaking through your usual pads every hour for 2 or more hours.     You have sudden, severe pain in your belly or pelvis.     You feel you cannot stop from hurting yourself or someone else.   Where to get help 24 hours a day, 7 days a week   If you or someone you know talks about suicide, self-harm, a mental health crisis, a substance use crisis, or any other kind of emotional distress, get help right away. You can:    Call the Suicide and Crisis Lifeline at 988.     Call 8-032-424-TALK (1-486.207.8935).     Text HOME to 427800 to access the Crisis Text Line.   Consider saving these numbers in your phone.  Go to [employer].org for more information or to chat online.  Call your doctor now or seek immediate medical care if:    You have a fever.     You are dizzy or lightheaded, or you feel like you may faint.     You have new or worse pain in your belly or pelvis.     You have vaginal discharge that smells bad.   Watch  closely for changes in your health, and be sure to contact your doctor if:    You do not get better as expected.

## 2024-07-13 NOTE — ED PROVIDER NOTES
"Emergency Department Midlevel Supervisory Note     I had a face to face encounter with this patient seen by the Advanced Practice Provider (ANYI). I personally made/approved the management plan and take responsibility for the patient management. I personally saw patient and performed a substantive portion of the visit including all aspects of the medical decision making.     ED Course:  11:58 AM Manasa Voss PA-C staffed patient with me. I agree with their assessment and plan of management, and I will see the patient.  12:02 PM I met with the patient to introduce myself, gather additional history, perform my initial exam, and discuss the plan.   2:00 PM Reassessed and updated patient with findings.    Brief HPI:     Concepción Kapadia is a 29 year old female who presents for evaluation of abdominal pain and vaginal bleeding.     Patient reports her LMP was 2024 and she took an  pill on 2024. Her OB provider noted up trending HCG and was concerned for ectopic pregnancy and advised her to go to the ED. Patient does have some minimal RLQ abdominal pain. She's never been pregnant before and denies history of miscarriage. There were no other concerns/complaints at this time.    I, Pavithra De La Cruz, am serving as a scribe to document services personally performed by Silviano Medellin MD, based on my observations and the provider's statements to me.   I, Silviano Medellin MD, attest that Pavithra De La Cruz was acting in a scribe capacity, has observed my performance of the services and has documented them in accordance with my direction.    Brief Physical Exam: /66   Pulse 74   Temp 97  F (36.1  C) (Temporal)   Resp 16   Ht 1.676 m (5' 6\")   Wt 98 kg (216 lb)   LMP 2024 (Exact Date)   SpO2 99%   BMI 34.86 kg/m    Constitutional:  Alert, in no acute distress  EYES: Conjunctivae clear  HENT:  Atraumatic  Respiratory:  Respirations even, unlabored, in no acute respiratory distress  Cardiovascular:  Regular " rate and rhythm, good peripheral perfusion  GI: Soft, non-distended, some mild right lower quadrant tenderness to palpation, no guarding or rebound.  Musculoskeletal:  Moves all 4 extremities equally, grossly symmetrical strength  Integument: Warm & dry. No appreciable rash, erythema.  Neurologic:  Alert & oriented, speech clear and fluent, no focal deficits noted  Psych: Normal mood and affect       MDM:  29-year-old female presents for evaluation of incomplete .  Patient had a medical  on , had some bleeding and cramping thereafter, though it resolved within a few days.  Subsequent beta-hCG increased to about 1600, though ultrasound is negative.  Repeat hCG on the  returned at 1700 which is increasing from prior.  Repeat today returned at 2000, which also continues to increase.  Discussed with OB/GYN, they recommended ultrasound to evaluate for possible ruptured ectopic, though likely will be managed with methotrexate.  Patient hemodynamically stable, no tachycardia, hemoglobin appears normal at 12.0, not tachycardic.  Will obtain ultrasound and coordinate with OB/GYN regarding likely methotrexate.    1. Inappropriate change in quantitative human chorionic gonadotropin (hCG) in early pregnancy        Consults:  OB-Gyn    Labs and Imaging:  Results for orders placed or performed during the hospital encounter of 24   HCG quantitative pregnancy (blood)   Result Value Ref Range    hCG Quantitative 2,092 (H) <5 mIU/mL   Extra Red Top Tube   Result Value Ref Range    Hold Specimen JIC    Extra Purple Top Tube   Result Value Ref Range    Hold Specimen JIC    Hepatic function panel   Result Value Ref Range    Protein Total 7.0 6.4 - 8.3 g/dL    Albumin 4.2 3.5 - 5.2 g/dL    Bilirubin Total 0.4 <=1.2 mg/dL    Alkaline Phosphatase 72 40 - 150 U/L    AST 33 0 - 45 U/L    ALT 35 0 - 50 U/L    Bilirubin Direct <0.20 0.00 - 0.30 mg/dL   Basic metabolic panel   Result Value Ref Range    Sodium 137  135 - 145 mmol/L    Potassium 4.3 3.4 - 5.3 mmol/L    Chloride 102 98 - 107 mmol/L    Carbon Dioxide (CO2) 23 22 - 29 mmol/L    Anion Gap 12 7 - 15 mmol/L    Urea Nitrogen 10.3 6.0 - 20.0 mg/dL    Creatinine 0.78 0.51 - 0.95 mg/dL    GFR Estimate >90 >60 mL/min/1.73m2    Calcium 9.1 8.6 - 10.0 mg/dL    Glucose 90 70 - 99 mg/dL   CBC with platelets and differential   Result Value Ref Range    WBC Count 8.1 4.0 - 11.0 10e3/uL    RBC Count 4.19 3.80 - 5.20 10e6/uL    Hemoglobin 12.0 11.7 - 15.7 g/dL    Hematocrit 36.0 35.0 - 47.0 %    MCV 86 78 - 100 fL    MCH 28.6 26.5 - 33.0 pg    MCHC 33.3 31.5 - 36.5 g/dL    RDW 12.8 10.0 - 15.0 %    Platelet Count 392 150 - 450 10e3/uL    % Neutrophils 58 %    % Lymphocytes 31 %    % Monocytes 7 %    % Eosinophils 3 %    % Basophils 1 %    % Immature Granulocytes 0 %    NRBCs per 100 WBC 0 <1 /100    Absolute Neutrophils 4.7 1.6 - 8.3 10e3/uL    Absolute Lymphocytes 2.5 0.8 - 5.3 10e3/uL    Absolute Monocytes 0.6 0.0 - 1.3 10e3/uL    Absolute Eosinophils 0.3 0.0 - 0.7 10e3/uL    Absolute Basophils 0.0 0.0 - 0.2 10e3/uL    Absolute Immature Granulocytes 0.0 <=0.4 10e3/uL    Absolute NRBCs 0.0 10e3/uL       I have reviewed the relevant laboratory studies above.      Procedures:  I was present for the key portions of procedures documented in ANYI/midlevel note, see midlevel note for further details.    Silviano Medellin MD  [facility] EMERGENCY ROOM  [address] [address] [phone]       Silviano Medellin MD  07/13/24 3655

## 2024-07-13 NOTE — ED TRIAGE NOTES
Pt arrives to ED with c/o possible ectopic pregnancy. Pt last menstrual cycle , pt took the  pill on  to terminate pregnancy. Pt states HCG levels are going up which provider states is probably an ectopic pregnancy and needs a different medication. No fetus on ultrasound due to being too small and early in her pregnancy.      Triage Assessment (Adult)       Row Name 24 1043          Triage Assessment    Airway WDL WDL        Respiratory WDL    Respiratory WDL WDL        Skin Circulation/Temperature WDL    Skin Circulation/Temperature WDL WDL        Cardiac WDL    Cardiac WDL WDL        Peripheral/Neurovascular WDL    Peripheral Neurovascular WDL WDL        Cognitive/Neuro/Behavioral WDL    Cognitive/Neuro/Behavioral WDL WDL

## 2024-07-13 NOTE — PROGRESS NOTES
OB/GYN Progress Note    Spoke to patient over phone. Patient presented to ER today for methotrexate treatment secondary to rising HCG after a medical  9 days ago. Risks and benefits of methotrexate reviewed, including failure of treatment requiring a second dose of medication or surgery. Potential side effects of methotrexate discussed. Patient to call [facility] on Monday to schedule lab draws for day 4 and day 7 HCG levels. Patient to call our answering service over the weekend with any additional questions or concerns. Reviewed when to return to ER.     Karlo Silva MD

## 2024-07-13 NOTE — ED PROVIDER NOTES
Emergency Department Encounter  [facility] EMERGENCY ROOM  Concepción Kapadia   AGE: 29 year old SEX: female  YOB: 1995  MRN: [id number]      EVALUATION DATE & TIME: 2024 10:40 AM ; PCP: Kathleen Remy   ED PROVIDER: Manasa Voss PA-C    CHIEF COMPLAINT: Threatened Miscarriage and Possible Ectopic Pregnancy       MEDICAL DECISION MAKING   Concepción Kapadia is a 29 year old female with a pertinent history of subclinical hypothyroidism, STI, and infertility who is 9 days s/p medical  who presents to the ED for evaluation of possible ectopic pregnancy given to rising hCG levels.  Initially, patient reported some right-sided pelvic pain which has mostly resolved. No vaginal bleeding or discharge. No fevers, chills, dysuria, nausea, vomiting, or increased urinary urgency.    Vitals reviewed and hemodynamically stable, afebrile. On exam, patient is well appearing and non toxic. Abdomen non tender.     Evaluation in the ED reassuring today. Beta hCG 2,092, elevated from 1,729 two days ago. CBC without leukocytosis, hemoglobin stable at 12.0.  BMP without significant electrolyte abnormality, kidney function at baseline (creatinine 0.78, eGFR > 90).  No elevation in liver enzymes. Consulted [facility] on call (MD Ricardo) who recommend OB US to assess for changes and possible rupture if ectopic. US with newly visualized cystic structure in the endometrium without yolk sac, embryo, or cardiac activity favoring intrauterine gestation as well as a right paraovarian cystic structure that could represent ectopic pregnancy. Fortunately, ruptured ectopic very unlikely at this point given benign abdomen and no fluid collections on US. OB wishes to move forward with methotrexate and placed order for administration in ED. Patient was consented over the phone by MD Ricardo.     Patient discharged home with close OBGYN follow up within 4 days for repeat hCGs.  Patient was provided with clear, written instructions of potential danger signs and where and when to return for emergency care or re-evaluation.     Medical Decision Making  Obtained supplemental history:Supplemental history obtained?: No  Reviewed external records: External records reviewed?: Documented in chart  Care impacted by chronic illness:N/A  Care significantly affected by social determinants of health:N/A  Did you consider but not order tests?: Work up considered but not performed and documented in chart, if applicable  Did you interpret images independently?: Independent interpretation of ECG and images noted in documentation, when applicable.  Consultation discussion with other provider:Did you involve another provider (consultant, , pharmacy, etc.)?: I discussed the care with another health care provider, see documentation for details.  Discharge. No recommendations on prescription strength medication(s). I considered admission, but ultimately discharged patient after no rupture seen in US.    FINAL IMPRESSION       ICD-10-CM    1. Inappropriate change in quantitative human chorionic gonadotropin (hCG) in early pregnancy  O02.81       2. Medical , incomplete, without complication  O07.4           ED COURSE   10:51 AM I met and introduced myself to the patient. I gathered initial history and performed my physical exam. We discussed plan for initial workup.  12:00 PM I have staffed the patient with Dr. Silviano Medellin ED MD, who has evaluated the patient and agrees with all aspects of today's care.   12:02 PM hCG rising, OBGYN consulted for further management.  12:26 PM Spoke with MD Ricardo from OBGYN who wants CBC, BMP, and LFT with US prior to methotrexate.  Patient updated.  2:35 PM MD Ricardo repaged to discuss US results and labs.   2:45 PM Spoke to MD Ricardo who reviewed case.  Will put order in for methotrexate.  Recommends office follow-up within 4 days.  3:16 PM I rechecked the patient and  discussed results, discharge, follow up, and reasons to return to the ED.     MEDICATIONS GIVEN IN THE EMERGENCY DEPARTMENT:   Medications   methotrexate sodium (PF) injection 107 mg (107 mg Intramuscular $Given 24 6549)      NEW PRESCRIPTIONS STARTED AT TODAY'S ED VISIT:  There are no discharge medications for this patient.          =================================================================         HISTORY OF PRESENT ILLNESS   Patient information was obtained from: patient   Use of Intrepreter: N/A     Concepción Kapadia is a 29 year old female with a pertinent history of subclinical hypothyroidism, STI, and infertility who presents to the ED by private vehicle for evaluation of possible ectopic pregnancy.  Patient reports having a medical  9 days ago () and has been seen multiple times since due to rising hCG levels.  Initially, patient reported some right-sided pelvic pain which mostly resolved after taking the medications.  She did have some vaginal bleeding starting on  but that has also subsided.  Patient was seen at [facility] by Akosua Correa DO and had hCG completed on  which showed rising hCG levels.  They advised her to come to the emergency department for treatment for ectopic pregnancy with methotrexate.  Patient declining ultrasound as OB/GYN to does not think this is necessary given no fetus will be seen at these hCG levels.  No fevers, chills, dysuria, nausea, vomiting, or increased urinary urgency.    Per chart review,  2024: Beta-hCG 1,729  2024 - Patient seen in this ED for vaginal bleeding and rising hCG levels.  Abdominal pain has resolved since taking medications.  Was seen at the clinic the previous day for repeat hCG level and received a phone call notify her that this level was increasing and advised to go to the emergency department to rule out ectopic.  Beta-hCG 1,672.  OB first trimester ultrasound without clear evidence of ectopic,  "incomplete , retained products, or other abnormality.  OB team consulted and recommended discharge and outpatient follow-up for repeat labs and ultrasound in 48 hours.    MEDICAL HISTORY     History reviewed. No pertinent past medical history.    History reviewed. No pertinent surgical history.    History reviewed. No pertinent family history.         Most Recent Immunizations   Administered Date(s) Administered    COVID-19 MONOVALENT 12+ (Pfizer) 2021    COVID-19 Monovalent 12+ (Pfizer ) 2022        No current outpatient medications on file.      PHYSICAL EXAM   VITALS:  Patient Vitals for the past 24 hrs:   BP Temp Temp src Pulse Resp SpO2 Height Weight   24 1615 110/57 98.6  F (37  C) Oral 80 20 99 % -- --   24 1530 132/78 98.8  F (37.1  C) Oral 73 18 100 % -- --   24 1303 -- -- -- 89 -- 99 % -- --   24 1300 122/66 -- -- 74 -- 99 % -- --   24 1042 137/79 97  F (36.1  C) Temporal 106 16 98 % 1.676 m (5' 6\") 98 kg (216 lb)     Body mass index is 34.86 kg/m .     Vitals reviewed. Nursing notes reviewed.  CONSITUTIONAL: Generally well appearing female in no acute distress. Well developed and nourished.   EYES: Conjunctivae clear. EOM grossly intact.   HENT: Normocephalic, atraumatic.  Moist mucous membranes.   NECK: Supple, gross ROM intact.   RESPIRATORY: Unlabored respiratory effort, speaks in full sentences.  Lungs clear to auscultation bilaterally without rhonchi, wheezes, rales.   CARDIO: Normal heart rate, regular rhythm, no murmurs.    GI: Soft, nondistended. Abdomen is non tender. No rebound or guarding.  MSK: Moving all 4 extremities intentionally and without pain. No joint swelling, redness, or obvious deformity.  SKIN: Warm, dry. No rashes or lesions.  NEURO:  AxOx4. CN II-XII grossly intact, but not individually tested. No focal neurological deficits.   PSYCH: Thoughts linear and responses appropriate. Cooperative. Appropriate mood and affect.     LABS " AND IMAGING   All pertinent labs reviewed and interpreted  Labs Ordered and Resulted from Time of ED Arrival to Time of ED Departure   HCG QUANTITATIVE PREGNANCY - Abnormal       Result Value    hCG Quantitative 2,092 (*)    HEPATIC FUNCTION PANEL - Normal    Protein Total 7.0      Albumin 4.2      Bilirubin Total 0.4      Alkaline Phosphatase 72      AST 33      ALT 35      Bilirubin Direct <0.20     BASIC METABOLIC PANEL - Normal    Sodium 137      Potassium 4.3      Chloride 102      Carbon Dioxide (CO2) 23      Anion Gap 12      Urea Nitrogen 10.3      Creatinine 0.78      GFR Estimate >90      Calcium 9.1      Glucose 90     CBC WITH PLATELETS AND DIFFERENTIAL    WBC Count 8.1      RBC Count 4.19      Hemoglobin 12.0      Hematocrit 36.0      MCV 86      MCH 28.6      MCHC 33.3      RDW 12.8      Platelet Count 392      % Neutrophils 58      % Lymphocytes 31      % Monocytes 7      % Eosinophils 3      % Basophils 1      % Immature Granulocytes 0      NRBCs per 100 WBC 0      Absolute Neutrophils 4.7      Absolute Lymphocytes 2.5      Absolute Monocytes 0.6      Absolute Eosinophils 0.3      Absolute Basophils 0.0      Absolute Immature Granulocytes 0.0      Absolute NRBCs 0.0         US OB 1st Trimester W Transvaginal W Doppler   Final Result   IMPRESSION:    1.  Newly visualized cystic structure in the endometrium, favor intrauterine gestation at 4 weeks 6 days. No yolk sac, embryo or cardiac activity is visualized.   2.  Subcentimeter paraovarian cystic structure on the right. While ectopic pregnancy is considered less likely, recommend short-term sonographic and beta hCG follow-up to exclude ectopic with pseudogestational sac.        Manasa Voss PA-C   Emergency Medicine   [facility] EMERGENCY ROOM  [address] [address] [phone]  Dept: [phone]      Manasa Voss PA-C  07/14/24 0732

## 2024-07-20 ENCOUNTER — HOSPITAL ENCOUNTER (EMERGENCY)
Facility: CLINIC | Age: 29
Discharge: HOME | End: 2024-07-20
Admitting: PHYSICIAN ASSISTANT
Payer: COMMERCIAL

## 2024-07-20 ENCOUNTER — APPOINTMENT (OUTPATIENT)
Dept: ULTRASOUND IMAGING | Facility: CLINIC | Age: 29
End: 2024-07-20
Attending: PHYSICIAN ASSISTANT
Payer: COMMERCIAL

## 2024-07-20 VITALS
OXYGEN SATURATION: 100 % | DIASTOLIC BLOOD PRESSURE: 70 MMHG | HEART RATE: 68 BPM | SYSTOLIC BLOOD PRESSURE: 125 MMHG | BODY MASS INDEX: 33.75 KG/M2 | RESPIRATION RATE: 16 BRPM | HEIGHT: 66 IN | WEIGHT: 210 LBS | TEMPERATURE: 97 F

## 2024-07-20 DIAGNOSIS — O00.90 ECTOPIC PREGNANCY, UNSPECIFIED LOCATION, UNSPECIFIED WHETHER INTRAUTERINE PREGNANCY PRESENT: ICD-10-CM

## 2024-07-20 DIAGNOSIS — R79.89 ELEVATED SERUM HUMAN CHORIONIC GONADOTROPIN (HCG) LEVEL: ICD-10-CM

## 2024-07-20 LAB
ANION GAP SERPL CALCULATED.3IONS-SCNC: 15 MMOL/L (ref 7–15)
BASOPHILS # BLD AUTO: 0.1 10E3/UL (ref 0–0.2)
BASOPHILS NFR BLD AUTO: 1 %
BUN SERPL-MCNC: 8.7 MG/DL (ref 6–20)
CALCIUM SERPL-MCNC: 9.1 MG/DL (ref 8.8–10.4)
CHLORIDE SERPL-SCNC: 103 MMOL/L (ref 98–107)
CREAT SERPL-MCNC: 0.76 MG/DL (ref 0.51–0.95)
EGFRCR SERPLBLD CKD-EPI 2021: >90 ML/MIN/1.73M2
EOSINOPHIL # BLD AUTO: 0.2 10E3/UL (ref 0–0.7)
EOSINOPHIL NFR BLD AUTO: 3 %
ERYTHROCYTE [DISTWIDTH] IN BLOOD BY AUTOMATED COUNT: 12.7 % (ref 10–15)
GLUCOSE SERPL-MCNC: 81 MG/DL (ref 70–99)
HCG INTACT+B SERPL-ACNC: 2646 MIU/ML
HCO3 SERPL-SCNC: 21 MMOL/L (ref 22–29)
HCT VFR BLD AUTO: 37.2 % (ref 35–47)
HGB BLD-MCNC: 12.3 G/DL (ref 11.7–15.7)
IMM GRANULOCYTES # BLD: 0 10E3/UL
IMM GRANULOCYTES NFR BLD: 0 %
LYMPHOCYTES # BLD AUTO: 2.9 10E3/UL (ref 0.8–5.3)
LYMPHOCYTES NFR BLD AUTO: 33 %
MCH RBC QN AUTO: 28.5 PG (ref 26.5–33)
MCHC RBC AUTO-ENTMCNC: 33.1 G/DL (ref 31.5–36.5)
MCV RBC AUTO: 86 FL (ref 78–100)
MONOCYTES # BLD AUTO: 0.6 10E3/UL (ref 0–1.3)
MONOCYTES NFR BLD AUTO: 7 %
NEUTROPHILS # BLD AUTO: 5 10E3/UL (ref 1.6–8.3)
NEUTROPHILS NFR BLD AUTO: 57 %
NRBC # BLD AUTO: 0 10E3/UL
NRBC BLD AUTO-RTO: 0 /100
PLATELET # BLD AUTO: 369 10E3/UL (ref 150–450)
POTASSIUM SERPL-SCNC: 4.2 MMOL/L (ref 3.4–5.3)
RBC # BLD AUTO: 4.32 10E6/UL (ref 3.8–5.2)
SODIUM SERPL-SCNC: 139 MMOL/L (ref 135–145)
WBC # BLD AUTO: 8.9 10E3/UL (ref 4–11)

## 2024-07-20 PROCEDURE — 85025 COMPLETE CBC W/AUTO DIFF WBC: CPT | Performed by: PHYSICIAN ASSISTANT

## 2024-07-20 PROCEDURE — 84702 CHORIONIC GONADOTROPIN TEST: CPT | Performed by: PHYSICIAN ASSISTANT

## 2024-07-20 PROCEDURE — 76801 OB US < 14 WKS SINGLE FETUS: CPT

## 2024-07-20 PROCEDURE — 99285 EMERGENCY DEPT VISIT HI MDM: CPT | Mod: 25

## 2024-07-20 PROCEDURE — 250N000011 HC RX IP 250 OP 636: Performed by: PHYSICIAN ASSISTANT

## 2024-07-20 PROCEDURE — 80048 BASIC METABOLIC PNL TOTAL CA: CPT | Performed by: PHYSICIAN ASSISTANT

## 2024-07-20 PROCEDURE — 96402 CHEMO HORMON ANTINEOPL SQ/IM: CPT

## 2024-07-20 PROCEDURE — 250N000013 HC RX MED GY IP 250 OP 250 PS 637: Performed by: PHYSICIAN ASSISTANT

## 2024-07-20 PROCEDURE — 36415 COLL VENOUS BLD VENIPUNCTURE: CPT | Performed by: PHYSICIAN ASSISTANT

## 2024-07-20 RX ORDER — ACETAMINOPHEN 325 MG/1
975 TABLET ORAL ONCE
Status: COMPLETED | OUTPATIENT
Start: 2024-07-20 | End: 2024-07-20

## 2024-07-20 RX ORDER — METHOTREXATE 25 MG/ML
50 INJECTION INTRA-ARTERIAL; INTRAMUSCULAR; INTRATHECAL; INTRAVENOUS ONCE
Status: COMPLETED | OUTPATIENT
Start: 2024-07-20 | End: 2024-07-20

## 2024-07-20 RX ADMIN — METHOTREXATE 105.5 MG: 25 SOLUTION INTRA-ARTERIAL; INTRAMUSCULAR; INTRATHECAL; INTRAVENOUS at 14:54

## 2024-07-20 RX ADMIN — ACETAMINOPHEN 975 MG: 325 TABLET ORAL at 10:00

## 2024-07-20 ASSESSMENT — ACTIVITIES OF DAILY LIVING (ADL)
ADLS_ACUITY_SCORE: 35

## 2024-07-20 ASSESSMENT — COLUMBIA-SUICIDE SEVERITY RATING SCALE - C-SSRS
6. HAVE YOU EVER DONE ANYTHING, STARTED TO DO ANYTHING, OR PREPARED TO DO ANYTHING TO END YOUR LIFE?: NO
1. IN THE PAST MONTH, HAVE YOU WISHED YOU WERE DEAD OR WISHED YOU COULD GO TO SLEEP AND NOT WAKE UP?: NO
2. HAVE YOU ACTUALLY HAD ANY THOUGHTS OF KILLING YOURSELF IN THE PAST MONTH?: NO

## 2024-07-20 NOTE — ED TRIAGE NOTES
"Arrives to ED for additional methotrexate dose. Dx with ectopic pregnancy. Given methotrexate last week. Had follow up with women's health for repeat hcg level. Per pt, level decreasing \"but not enough\". Sent to ED for additional medication.      Triage Assessment (Adult)       Row Name 07/20/24 0924          Triage Assessment    Airway WDL WDL        Respiratory WDL    Respiratory WDL WDL        Skin Circulation/Temperature WDL    Skin Circulation/Temperature WDL WDL        Cardiac WDL    Cardiac WDL WDL        Peripheral/Neurovascular WDL    Peripheral Neurovascular WDL WDL        Cognitive/Neuro/Behavioral WDL    Cognitive/Neuro/Behavioral WDL WDL                     "

## 2024-07-20 NOTE — ED PROVIDER NOTES
EMERGENCY DEPARTMENT ENCOUNTER   NAME: Concepción Kapadia ; AGE: 29 year old female ; YOB: 1995 ; MRN: [id number] ; PCP: Kathleen Remy     Evaluation Date & Time: 7/20/2024  9:27 AM    ED Provider: Danika Cruz PA-C    CHIEF COMPLAINT     Referral      FINAL ASSESSMENT       ICD-10-CM    1. Elevated serum human chorionic gonadotropin (hCG) level  R79.89       2. Ectopic pregnancy, unspecified location, unspecified whether intrauterine pregnancy present  O00.90           ED COURSE, MEDICAL DECISION MAKING, PLAN     ED course     9:44 AM Evaluated patient. Performed physical exam. Plan for line, labs, US.   12:42 PM Rechecked and updated patient. Awaiting radiologist read.   1:29 PM rechecked and updated patient.  [facility].  1:37 PM Spoke with Dr. Willard from [facility]. Will do another dose of Methotrexate and have her follow up on Tuesday for HCG.   1:46 PM Updated patient.   ______________________________________________________________________    Concepción Kapadia is a 29 year old female with no pertinent past medical history presenting for elevated hCG levels as reported by her OB clinic.  States she was told she needed to come here for additional methotrexate dosing.  Having mild abdominal cramping, but no further vaginal bleeding.    Exam: Minimal left lower abdomen tenderness with palpating.  The rest of the exam is benign.  She is vitally normal.    Differentials: Ectopic pregnancy, retained products of conception, molar pregnancy    Labs: Laboratory workup remarkable for an hCG of 2646.  The rest of the labs are largely unrevealing.    EKG: N/A    Imaging: OB ultrasound completed.  Radiologist read:   FINDINGS:  UTERUS: Possible gestational sac measuring 5 weeks 0 days, no definite fetal pole or yolk sac demonstrated. A second cystic area measuring 4 weeks 4 days noted. No fetal pole or yolk sac seen in this cystic  area.  PLACENTA: Not yet formed. No evidence for sub-chorionic hemorrhage.  RIGHT OVARY: Inferior to the right ovary demonstrates a 2.0 x 1.7 x 1.7 cm cystic area. Ectopic pregnancy not excluded.  LEFT OVARY: Normal.    Consults: Consulted with Dr. Willard from [facility] who recommended methotrexate today and follow-up on Tuesday for repeat hCG levels.    Interventions/recheck: Patient was given 975 mg of Tylenol for headache with significant improvement.  She declined doing a liter of fluids.  Patient also given 105.5 mg of methotrexate IM.    Assessment: Inappropriately elevated hCG levels with suspected ectopic pregnancy.  No evidence to suggest a ruptured ectopic.  She is not having any significant pain.  No free fluid seen on ultrasound.  Overall, no red flag s/s present to suggest an acutely serious or life threatening condition that would necessitate hospitalization.     Plan: Patient given methotrexate here.  She will follow-up with the [facility] clinic on Tuesday for repeat hCG levels.  Strict return protocols discussed.  Patient/parent/guardian understanding and agreeable with the plan and will discharge to home in good condition.     ______________________________________________________________________    *All pertinent lab & imaging studies independently reviewed. (See chart for details)   *Discussed the results of all the tests and plan with patient and family/guardians.   *The patient and/or family/guardian acknowledged understanding and was agreeable with the care plan.      HISTORY OF PRESENT ILLNESS   Patient information was obtained from: Patient    Use of Intrepreter: N/A     Concepción Kapadia is a 29 year old female with no pertinent past medical history who presents to the ED by means of walk-in for evaluation of inappropriate HCG levels and abdominal cramping in relation to a medical  that was initiated on 7/3/2024.    Patient states she has not had any  "further vaginal bleeding since about 4 days ago.  No severe cramping or abdominal pain.    Per my chart review,    hCG 11 days ago was 1672, 9 days ago was 1729, and 7 days ago was 2092.  In looking at care everywhere, it appears that it was 4617 on  and 4694 yesterday.      2024: [facility] ER for vaginal bleeding and rising hCG levels.  Patient underwent a medical  on 7/3.  Had been experiencing sharp right lower quadrant pain, but that had resolved after taking the medication.  She received a phone call from the clinic stating that her hCG levels were still increasing and that she should present to the ER to rule out ectopic pregnancy.  Ultrasound was completed. 1. Pregnancy of unknown location. Possibilities include failed pregnancy (favored in this clinical context), an unidentified ectopic pregnancy, or an early intrauterine pregnancy (too early to detect). Correlate with beta hCG values and consider follow-up ultrasound in 7-14 days. 2. If this is a failed pregnancy, there is no sonographic evidence of retained products of conception. OB was consulted and recommended a follow-up in 48 hours with them for repeat labs/ultrasound.    2024: [facility] ER for abdominal pain and vaginal bleeding following medical  on 2024. \"Patient had a medical  on , had some bleeding and cramping thereafter, though it resolved within a few days.  Subsequent beta-hCG increased to about 1600, though ultrasound is negative.  Repeat hCG on the  returned at 1700 which is increasing from prior.  Repeat today returned at 2000.\"  OB was consulted again and they recommended repeat ultrasound. 1.  Newly visualized cystic structure in the endometrium, favor intrauterine gestation at 4 weeks 6 days. No yolk sac, embryo or cardiac activity is visualized. 2.  Subcentimeter paraovarian cystic structure on the right. While ectopic pregnancy is considered less likely, recommend short-term " "sonographic and beta hCG follow-up to exclude ectopic with pseudogestational sac.  OB/GYN recommended methotrexate so patient was given 107 mg.        MEDICAL HISTORY     No past medical history on file.    No past surgical history on file.    No family history on file.         No current outpatient medications on file.        PHYSICAL EXAM     First Vitals:  Patient Vitals for the past 24 hrs:   BP Temp Temp src Pulse Resp SpO2 Height Weight   07/20/24 1235 -- -- -- 68 -- 100 % -- --   07/20/24 1230 121/75 -- -- -- -- -- -- --   07/20/24 0952 117/72 -- -- 86 -- 98 % -- --   07/20/24 0923 129/84 97  F (36.1  C) Temporal 84 16 97 % 1.676 m (5' 6\") 95.3 kg (210 lb)         PHYSICAL EXAM:   Constitutional: No acute distress.  Neuro: Awake and alert. No focal deficits.  Psych: Calm and cooperative.  Eyes: PERRL. EOMI. Conjunctivae clear.    Mouth: Pink and moist.    Cardio: Regular rate. Adequate perfusion to extremities.   Pulmonary: Oxygenating well on RA. No labored breathing.   Abdomen: Minimal tenderness with palpating in the left lower abdomen/pelvic area.  Back: Appears to move freely. No CVA tenderness.    Skin: Natural color, warm, dry, intact.       RESULTS     LAB:  All pertinent labs reviewed and interpreted  Labs Ordered and Resulted from Time of ED Arrival to Time of ED Departure   HCG QUANTITATIVE PREGNANCY - Abnormal       Result Value    hCG Quantitative 2,646 (*)    BASIC METABOLIC PANEL - Abnormal    Sodium 139      Potassium 4.2      Chloride 103      Carbon Dioxide (CO2) 21 (*)     Anion Gap 15      Urea Nitrogen 8.7      Creatinine 0.76      GFR Estimate >90      Calcium 9.1      Glucose 81     CBC WITH PLATELETS AND DIFFERENTIAL    WBC Count 8.9      RBC Count 4.32      Hemoglobin 12.3      Hematocrit 37.2      MCV 86      MCH 28.5      MCHC 33.1      RDW 12.7      Platelet Count 369      % Neutrophils 57      % Lymphocytes 33      % Monocytes 7      % Eosinophils 3      % Basophils 1      % " Immature Granulocytes 0      NRBCs per 100 WBC 0      Absolute Neutrophils 5.0      Absolute Lymphocytes 2.9      Absolute Monocytes 0.6      Absolute Eosinophils 0.2      Absolute Basophils 0.1      Absolute Immature Granulocytes 0.0      Absolute NRBCs 0.0         RADIOLOGY:  US OB 1st Trimester W Transvaginal W Doppler   Final Result   IMPRESSION:    1.  Nonspecific cystic areas within the endometrial canal.   2.  Nonspecific cystic lesion in the right adnexa.                ECG:    N/A      PROCEDURES     None         MEDICAL DECISION MAKING:  Obtained supplemental history:Supplemental history obtained?: No  Reviewed external records: External records reviewed?: Outpatient Record: See [facility]  Care impacted by chronic illness:N/A  Care significantly affected by social determinants of health:N/A  Did you consider but not order tests?: Work up considered but not performed and documented in chart, if applicable  Did you interpret images independently?: Independent interpretation of ECG and images noted in documentation, when applicable.  Consultation discussion with other provider:Did you involve another provider (consultant, MH, pharmacy, etc.)?: I discussed the care with another health care provider, see documentation for details.  Discharge. No recommendations on prescription strength medication(s). See documentation for any additional details.      FINAL IMPRESSION:    ICD-10-CM    1. Elevated serum human chorionic gonadotropin (hCG) level  R79.89       2. Ectopic pregnancy, unspecified location, unspecified whether intrauterine pregnancy present  O00.90             MEDICATIONS GIVEN IN THE EMERGENCY DEPARTMENT:  Medications   methotrexate sodium (PF) injection 105.5 mg (has no administration in time range)   acetaminophen (TYLENOL) tablet 975 mg (975 mg Oral $Given 7/20/24 1000)         NEW PRESCRIPTIONS STARTED AT TODAY'S ED VISIT:  New Prescriptions    No medications on file            Some or all of this  documentation has been completed using dictation software and mild grammatical errors may be present. Please contact me with any concerns regarding this.       Danika Cruz PA-C  Emergency Medicine   [facility] EMERGENCY ROOM       Danika Cruz PA-C  07/20/24 6227

## 2024-07-20 NOTE — DISCHARGE INSTRUCTIONS
Follow-up with [facility] on Tuesday for repeat hCG levels.  Return to the ER for any severe pain, bleeding, dizziness, etc.

## 2025-04-29 ENCOUNTER — PATIENT OUTREACH (OUTPATIENT)
Dept: CARE COORDINATION | Facility: CLINIC | Age: 30
End: 2025-04-29
Payer: COMMERCIAL

## 2025-06-29 ENCOUNTER — HEALTH MAINTENANCE LETTER (OUTPATIENT)
Age: 30
End: 2025-06-29